# Patient Record
Sex: FEMALE | Race: BLACK OR AFRICAN AMERICAN | NOT HISPANIC OR LATINO | Employment: UNEMPLOYED | URBAN - METROPOLITAN AREA
[De-identification: names, ages, dates, MRNs, and addresses within clinical notes are randomized per-mention and may not be internally consistent; named-entity substitution may affect disease eponyms.]

---

## 2021-04-16 ENCOUNTER — PATIENT MESSAGE (OUTPATIENT)
Dept: RESEARCH | Facility: HOSPITAL | Age: 51
End: 2021-04-16

## 2021-11-16 ENCOUNTER — TELEPHONE (OUTPATIENT)
Dept: OBSTETRICS AND GYNECOLOGY | Facility: CLINIC | Age: 51
End: 2021-11-16
Payer: COMMERCIAL

## 2021-11-16 DIAGNOSIS — Z12.39 SCREENING BREAST EXAMINATION: Primary | ICD-10-CM

## 2021-11-17 ENCOUNTER — HOSPITAL ENCOUNTER (OUTPATIENT)
Dept: RADIOLOGY | Facility: OTHER | Age: 51
Discharge: HOME | End: 2021-11-17
Attending: OBSTETRICS & GYNECOLOGY
Payer: COMMERCIAL

## 2021-11-17 DIAGNOSIS — Z12.31 BREAST CANCER SCREENING BY MAMMOGRAM: ICD-10-CM

## 2021-11-17 DIAGNOSIS — Z12.39 SCREENING BREAST EXAMINATION: ICD-10-CM

## 2021-11-17 PROCEDURE — 77067 MAMMO DIGITAL SCREENING BILAT WITH TOMO: ICD-10-PCS | Mod: 26,,, | Performed by: RADIOLOGY

## 2021-11-17 PROCEDURE — 77067 SCR MAMMO BI INCL CAD: CPT | Mod: TC

## 2021-11-17 PROCEDURE — 77063 MAMMO DIGITAL SCREENING BILAT WITH TOMO: ICD-10-PCS | Mod: 26,,, | Performed by: RADIOLOGY

## 2021-11-17 PROCEDURE — 77067 SCR MAMMO BI INCL CAD: CPT | Mod: 26,,, | Performed by: RADIOLOGY

## 2021-11-17 PROCEDURE — 77063 BREAST TOMOSYNTHESIS BI: CPT | Mod: 26,,, | Performed by: RADIOLOGY

## 2021-11-22 ENCOUNTER — OFFICE VISIT (OUTPATIENT)
Dept: OBSTETRICS AND GYNECOLOGY | Facility: CLINIC | Age: 51
End: 2021-11-22
Payer: COMMERCIAL

## 2021-11-22 VITALS
BODY MASS INDEX: 41.07 KG/M2 | HEIGHT: 67 IN | SYSTOLIC BLOOD PRESSURE: 128 MMHG | WEIGHT: 261.69 LBS | DIASTOLIC BLOOD PRESSURE: 84 MMHG

## 2021-11-22 DIAGNOSIS — Z01.419 ROUTINE GYNECOLOGICAL EXAMINATION: Primary | ICD-10-CM

## 2021-11-22 PROCEDURE — 99999 PR PBB SHADOW E&M-EST. PATIENT-LVL III: CPT | Mod: PBBFAC,,, | Performed by: OBSTETRICS & GYNECOLOGY

## 2021-11-22 PROCEDURE — 99386 PREV VISIT NEW AGE 40-64: CPT | Mod: S$GLB,,, | Performed by: OBSTETRICS & GYNECOLOGY

## 2021-11-22 PROCEDURE — 99386 PR PREVENTIVE VISIT,NEW,40-64: ICD-10-PCS | Mod: S$GLB,,, | Performed by: OBSTETRICS & GYNECOLOGY

## 2021-11-22 PROCEDURE — 99999 PR PBB SHADOW E&M-EST. PATIENT-LVL III: ICD-10-PCS | Mod: PBBFAC,,, | Performed by: OBSTETRICS & GYNECOLOGY

## 2021-11-22 PROCEDURE — 88175 CYTOPATH C/V AUTO FLUID REDO: CPT | Performed by: OBSTETRICS & GYNECOLOGY

## 2021-11-29 LAB
FINAL PATHOLOGIC DIAGNOSIS: NORMAL
Lab: NORMAL

## 2024-02-22 ENCOUNTER — TELEPHONE (OUTPATIENT)
Dept: INTERNAL MEDICINE | Facility: CLINIC | Age: 54
End: 2024-02-22
Payer: COMMERCIAL

## 2024-02-22 NOTE — TELEPHONE ENCOUNTER
----- Message from Dimitris Mesa sent at 2/22/2024 10:44 AM CST -----  Contact: self [phone]  New pt requesting a call in regards to appt, stated the nurse recommended her.    Please call and advise

## 2024-02-28 ENCOUNTER — OFFICE VISIT (OUTPATIENT)
Dept: INTERNAL MEDICINE | Facility: CLINIC | Age: 54
End: 2024-02-28
Payer: COMMERCIAL

## 2024-02-28 VITALS
HEIGHT: 67 IN | SYSTOLIC BLOOD PRESSURE: 122 MMHG | HEART RATE: 87 BPM | WEIGHT: 264.31 LBS | DIASTOLIC BLOOD PRESSURE: 86 MMHG | BODY MASS INDEX: 41.48 KG/M2

## 2024-02-28 DIAGNOSIS — Z12.31 ENCOUNTER FOR SCREENING MAMMOGRAM FOR MALIGNANT NEOPLASM OF BREAST: ICD-10-CM

## 2024-02-28 DIAGNOSIS — Z00.00 ENCOUNTER FOR MEDICAL EXAMINATION TO ESTABLISH CARE: ICD-10-CM

## 2024-02-28 DIAGNOSIS — Z23 NEED FOR VACCINATION: ICD-10-CM

## 2024-02-28 DIAGNOSIS — E04.9 GOITER: Primary | ICD-10-CM

## 2024-02-28 DIAGNOSIS — Z01.89 ENCOUNTER FOR ROUTINE LABORATORY TESTING: ICD-10-CM

## 2024-02-28 DIAGNOSIS — Z12.11 COLON CANCER SCREENING: ICD-10-CM

## 2024-02-28 PROCEDURE — 99999 PR PBB SHADOW E&M-EST. PATIENT-LVL IV: CPT | Mod: PBBFAC,,, | Performed by: STUDENT IN AN ORGANIZED HEALTH CARE EDUCATION/TRAINING PROGRAM

## 2024-02-28 PROCEDURE — 3074F SYST BP LT 130 MM HG: CPT | Mod: CPTII,S$GLB,, | Performed by: STUDENT IN AN ORGANIZED HEALTH CARE EDUCATION/TRAINING PROGRAM

## 2024-02-28 PROCEDURE — 3008F BODY MASS INDEX DOCD: CPT | Mod: CPTII,S$GLB,, | Performed by: STUDENT IN AN ORGANIZED HEALTH CARE EDUCATION/TRAINING PROGRAM

## 2024-02-28 PROCEDURE — 1160F RVW MEDS BY RX/DR IN RCRD: CPT | Mod: CPTII,S$GLB,, | Performed by: STUDENT IN AN ORGANIZED HEALTH CARE EDUCATION/TRAINING PROGRAM

## 2024-02-28 PROCEDURE — 99203 OFFICE O/P NEW LOW 30 MIN: CPT | Mod: S$GLB,,, | Performed by: STUDENT IN AN ORGANIZED HEALTH CARE EDUCATION/TRAINING PROGRAM

## 2024-02-28 PROCEDURE — 3079F DIAST BP 80-89 MM HG: CPT | Mod: CPTII,S$GLB,, | Performed by: STUDENT IN AN ORGANIZED HEALTH CARE EDUCATION/TRAINING PROGRAM

## 2024-02-28 PROCEDURE — 1159F MED LIST DOCD IN RCRD: CPT | Mod: CPTII,S$GLB,, | Performed by: STUDENT IN AN ORGANIZED HEALTH CARE EDUCATION/TRAINING PROGRAM

## 2024-02-28 RX ORDER — MULTIVITAMIN
1 TABLET ORAL DAILY
COMMUNITY

## 2024-02-28 RX ORDER — AMOXICILLIN 500 MG
CAPSULE ORAL DAILY
COMMUNITY

## 2024-02-28 NOTE — PATIENT INSTRUCTIONS
VACCINES  The shingles and Tdap (tetanus) vaccines have been ordered which you will have completed today.     LABS  Labs have been ordered. Please have completed at your earliest convenience.    IMAGING  A thyroid ultrasound and mammogram have been ordered. Please schedule at check out or by calling [phone].    COLONOSCOPY  Referral for colonoscopy scheduling has been ordered. They will call you to schedule your exam.    FOLLOW-UP  Please return to see me within the next 4 weeks after completing lab work.

## 2024-02-28 NOTE — PROGRESS NOTES
[facility] ESTABLISH CARE VISIT    CHIEF COMPLAINT:   Chief Complaint   Patient presents with    [facility]       HISTORY OF PRESENT ILLNESS: Carlota Galarza is a 53 y.o. female who presents here today to [facility]. She has not seen a PCP for many years - last evaluation was with Dr. Sanches in 2013.     Patient has a history of goiter which has progressively been getting larger over the years. She has had the goiter since age 19 but has mostly been getting worse over the last 12 years. Thyroid function has historically been normal - last TSH in our records is from 2014 and was normal. Over the past year or so she has noticed some difficulty swallowing and speaking at times due to the size of the goiter. She denies symptoms of thyroid dysfunction including fatigue, cold intolerance, weight changes, hair loss, palpitations.     Patient otherwise has no medical conditions. She does take some vitamins and supplements but no other medications. She has no acute concerns today.      MEDICAL HISTORY:    Past Medical History:   Diagnosis Date    Goiter        MEDICATIONS:    Current Outpatient Medications on File Prior to Visit   Medication Sig Dispense Refill    ergocalciferol, vitamin D2, (VITAMIN D ORAL) Take by mouth.      multivitamin (ONE DAILY MULTIVITAMIN) per tablet Take 1 tablet by mouth once daily.      omega-3 fatty acids/fish oil (FISH OIL-OMEGA-3 FATTY ACIDS) 300-1,000 mg capsule Take by mouth once daily.      ZINC CITRATE ORAL Take by mouth.      [DISCONTINUED] misoprostol (CYTOTEC) 200 MCG Tab TAKE 9H, 5H, 1H BEFORE PROCEDURE (Patient not taking: Reported on 11/22/2021) 3 tablet 0    [DISCONTINUED] norethindrone (AYGESTIN) 5 mg Tab Take 1 tablet (5 mg total) by mouth once daily. TAKE D 1-10 Q MONTH 10 tablet 12     No current facility-administered medications on file prior to visit.       PHYSICAL EXAM:    /86 (BP Location: Left arm, Patient Position: Sitting, BP Method: X-Large  "(Manual))   Pulse 87   Ht 5' 7" (1.702 m)   Wt 119.9 kg (264 lb 5.3 oz)   BMI 41.40 kg/m²     Physical Exam  Vitals and nursing note reviewed.   Constitutional:       General: She is not in acute distress.     Appearance: Normal appearance. She is not ill-appearing, toxic-appearing or diaphoretic.   HENT:      Head: Normocephalic and atraumatic.      Nose: Nose normal.   Eyes:      Extraocular Movements: Extraocular movements intact.      Conjunctiva/sclera: Conjunctivae normal.      Pupils: Pupils are equal, round, and reactive to light.   Neck:      Thyroid: Thyromegaly present. No thyroid tenderness.      Comments: Large goiter, left > right. No discrete mass palpated. Non-tender.  Cardiovascular:      Rate and Rhythm: Normal rate and regular rhythm.      Heart sounds: Normal heart sounds. No murmur heard.  Pulmonary:      Effort: Pulmonary effort is normal. No respiratory distress.      Breath sounds: Normal breath sounds. No wheezing.   Musculoskeletal:         General: No deformity. Normal range of motion.      Cervical back: Full passive range of motion without pain.   Skin:     Findings: No lesion or rash.   Neurological:      General: No focal deficit present.      Mental Status: She is alert.      Motor: No weakness.      Gait: Gait normal.   Psychiatric:         Mood and Affect: Mood normal.         Behavior: Behavior normal.         Thought Content: Thought content normal.         Judgment: Judgment normal.              ASSESSMENT & PLAN:    Carlota was seen today for establish care.    Diagnoses and all orders for this visit:    Goiter  Patient presenting with chronic painless goiter progressively increasing in size over the past few years.   Last TSH 2014 WNL  Last US 2013: diffuse enlargement of both lobes of thyroid, no focal mass  Obtain updated US & Labs as below  -     US Thyroid; Future; Expected date: 02/28/2024  -     TSH; Future; Expected date: 02/28/2024  -     T4, FREE; Future; " Expected date: 02/28/2024    Colon cancer screening  No prior screening. FH of colon cancer in mother (diagnosed in 60s) and possibly father. Patient agreeable to colonoscopy. Ordered today.  -     Ambulatory referral/consult to Endo Procedure ; Future; Expected date: 02/29/2024    Encounter for screening mammogram for malignant neoplasm of breast  Last mammogram 2021 WNL - updated mammogram ordered today.  -     Mammo Digital Screening Bilat w/ John; Future; Expected date: 02/28/2024    Encounter for routine laboratory testing  Patient has not had labs drawn for several years and is due for diabetes screening, cholesterol screening, hepatitis C screening, HIV screening.   Labs ordered as below.  -     CBC Auto Differential; Future; Expected date: 02/28/2024  -     Comprehensive Metabolic Panel; Future; Expected date: 02/28/2024  -     Hemoglobin A1C; Future; Expected date: 02/28/2024  -     Hepatitis C Antibody; Future; Expected date: 02/28/2024  -     HIV 1/2 Ag/Ab (4th Gen); Future; Expected date: 02/28/2024  -     Lipid Panel; Future; Expected date: 02/28/2024  -     TSH; Future; Expected date: 02/28/2024  -     T4, FREE; Future; Expected date: 02/28/2024    Need for vaccination  Patient due for shingles & Tdap vaccine - ordered today  Consider influenza & COVID booster next season    Encounter for medical examination to establish care  Reviewed medical history, surgical history, social history, medication list, health screenings, and immunizations.       Patient to follow-up within 4 weeks to discuss US & lab results.        Kortney Soto MD  [facility]

## 2024-02-29 ENCOUNTER — HOSPITAL ENCOUNTER (OUTPATIENT)
Dept: RADIOLOGY | Facility: HOSPITAL | Age: 54
Discharge: HOME | End: 2024-02-29
Attending: STUDENT IN AN ORGANIZED HEALTH CARE EDUCATION/TRAINING PROGRAM
Payer: COMMERCIAL

## 2024-02-29 DIAGNOSIS — E04.9 GOITER: ICD-10-CM

## 2024-02-29 PROCEDURE — 76536 US EXAM OF HEAD AND NECK: CPT | Mod: 26,,, | Performed by: RADIOLOGY

## 2024-02-29 PROCEDURE — 76536 US EXAM OF HEAD AND NECK: CPT | Mod: TC

## 2024-03-07 ENCOUNTER — TELEPHONE (OUTPATIENT)
Dept: ENDOSCOPY | Facility: HOSPITAL | Age: 54
End: 2024-03-07

## 2024-03-07 ENCOUNTER — CLINICAL SUPPORT (OUTPATIENT)
Dept: ENDOSCOPY | Facility: HOSPITAL | Age: 54
End: 2024-03-07
Attending: RADIOLOGY
Payer: COMMERCIAL

## 2024-03-07 DIAGNOSIS — Z12.11 COLON CANCER SCREENING: ICD-10-CM

## 2024-03-07 NOTE — TELEPHONE ENCOUNTER
Spoke to pt to schedule procedure(s) Colonoscopy       Physician to perform procedure(s) Dr. VANDANA Dominguez  Date of Procedure (s) 6/4/24  Arrival Time 1:00 PM  Time of Procedure(s) 2:00 PM   Location of Procedure(s) [facility] [address] [facility]  Type of Rx Prep sent to patient: Miralax  Instructions provided to patient via [facility]    Patient was informed on the following information and verbalized understanding. Screening questionnaire reviewed with patient and complete. If procedure requires anesthesia, a responsible adult needs to be present to accompany the patient home, patient cannot drive after receiving anesthesia. Appointment details are tentative, especially check-in time. Patient will receive a prep-op call 7 days prior to confirm check-in time for procedure. If applicable the patient should contact their pharmacy to verify Rx for procedure prep is ready for pick-up. Patient was advised to call the scheduling department at [phone] if pharmacy states no Rx is available. Patient was advised to call the endoscopy scheduling department if any questions or concerns arise.      [facility] Scheduling Department

## 2024-03-25 ENCOUNTER — LAB VISIT (OUTPATIENT)
Dept: LAB | Facility: HOSPITAL | Age: 54
End: 2024-03-25
Payer: COMMERCIAL

## 2024-03-25 ENCOUNTER — OFFICE VISIT (OUTPATIENT)
Dept: INTERNAL MEDICINE | Facility: CLINIC | Age: 54
End: 2024-03-25
Payer: COMMERCIAL

## 2024-03-25 VITALS
BODY MASS INDEX: 41.21 KG/M2 | HEIGHT: 67 IN | HEART RATE: 66 BPM | DIASTOLIC BLOOD PRESSURE: 88 MMHG | OXYGEN SATURATION: 98 % | SYSTOLIC BLOOD PRESSURE: 128 MMHG | WEIGHT: 262.56 LBS

## 2024-03-25 DIAGNOSIS — E78.00 BORDERLINE HYPERCHOLESTEROLEMIA: ICD-10-CM

## 2024-03-25 DIAGNOSIS — E04.9 GOITER: Primary | ICD-10-CM

## 2024-03-25 DIAGNOSIS — E04.9 GOITER: ICD-10-CM

## 2024-03-25 LAB
T3FREE SERPL-MCNC: 3.2 PG/ML (ref 2.3–4.2)
T4 FREE SERPL-MCNC: 0.63 NG/DL (ref 0.71–1.51)
THYROGLOB AB SERPL IA-ACNC: <4 IU/ML (ref 0–3.9)
THYROPEROXIDASE IGG SERPL-ACNC: 53.6 IU/ML
TSH SERPL DL<=0.005 MIU/L-ACNC: 1.53 UIU/ML (ref 0.4–4)

## 2024-03-25 PROCEDURE — 84443 ASSAY THYROID STIM HORMONE: CPT | Performed by: STUDENT IN AN ORGANIZED HEALTH CARE EDUCATION/TRAINING PROGRAM

## 2024-03-25 PROCEDURE — 86800 THYROGLOBULIN ANTIBODY: CPT | Performed by: STUDENT IN AN ORGANIZED HEALTH CARE EDUCATION/TRAINING PROGRAM

## 2024-03-25 PROCEDURE — 3008F BODY MASS INDEX DOCD: CPT | Mod: CPTII,S$GLB,, | Performed by: STUDENT IN AN ORGANIZED HEALTH CARE EDUCATION/TRAINING PROGRAM

## 2024-03-25 PROCEDURE — 1159F MED LIST DOCD IN RCRD: CPT | Mod: CPTII,S$GLB,, | Performed by: STUDENT IN AN ORGANIZED HEALTH CARE EDUCATION/TRAINING PROGRAM

## 2024-03-25 PROCEDURE — 84439 ASSAY OF FREE THYROXINE: CPT | Performed by: STUDENT IN AN ORGANIZED HEALTH CARE EDUCATION/TRAINING PROGRAM

## 2024-03-25 PROCEDURE — 86800 THYROGLOBULIN ANTIBODY: CPT | Mod: 91 | Performed by: STUDENT IN AN ORGANIZED HEALTH CARE EDUCATION/TRAINING PROGRAM

## 2024-03-25 PROCEDURE — 3074F SYST BP LT 130 MM HG: CPT | Mod: CPTII,S$GLB,, | Performed by: STUDENT IN AN ORGANIZED HEALTH CARE EDUCATION/TRAINING PROGRAM

## 2024-03-25 PROCEDURE — 3079F DIAST BP 80-89 MM HG: CPT | Mod: CPTII,S$GLB,, | Performed by: STUDENT IN AN ORGANIZED HEALTH CARE EDUCATION/TRAINING PROGRAM

## 2024-03-25 PROCEDURE — 99213 OFFICE O/P EST LOW 20 MIN: CPT | Mod: S$GLB,,, | Performed by: STUDENT IN AN ORGANIZED HEALTH CARE EDUCATION/TRAINING PROGRAM

## 2024-03-25 PROCEDURE — 99999 PR PBB SHADOW E&M-EST. PATIENT-LVL IV: CPT | Mod: PBBFAC,,, | Performed by: STUDENT IN AN ORGANIZED HEALTH CARE EDUCATION/TRAINING PROGRAM

## 2024-03-25 PROCEDURE — 86376 MICROSOMAL ANTIBODY EACH: CPT | Performed by: STUDENT IN AN ORGANIZED HEALTH CARE EDUCATION/TRAINING PROGRAM

## 2024-03-25 PROCEDURE — 3044F HG A1C LEVEL LT 7.0%: CPT | Mod: CPTII,S$GLB,, | Performed by: STUDENT IN AN ORGANIZED HEALTH CARE EDUCATION/TRAINING PROGRAM

## 2024-03-25 PROCEDURE — 1160F RVW MEDS BY RX/DR IN RCRD: CPT | Mod: CPTII,S$GLB,, | Performed by: STUDENT IN AN ORGANIZED HEALTH CARE EDUCATION/TRAINING PROGRAM

## 2024-03-25 PROCEDURE — 84481 FREE ASSAY (FT-3): CPT | Performed by: STUDENT IN AN ORGANIZED HEALTH CARE EDUCATION/TRAINING PROGRAM

## 2024-03-25 NOTE — PROGRESS NOTES
[facility] PRIMARY CARE FOLLOW-UP VISIT    CHIEF COMPLAINT:   Chief Complaint   Patient presents with    Follow-up       HISTORY OF PRESENT ILLNESS: Carlota Galarza is a 53 y.o. female who presents here today for follow-up of the following:    Goiter  US thyroid done 2/28 revealing of diffuse markedly enlarged heterogeneous thyroid gland with increased vascularity consistent with autoimmune thyroiditis.   Labs 2/29 with TSH normal & T4 slightly low at 0.62.     Lab F/U  Labs ordered last visit 2/28; collected 2/29.  Lipid panel with cholesterol 217, triglycerides 171. ASCVD risk score 3.7%.  TSH & T4 as above.  CBC, CMP, A1c, hep c Ab, HIV also all within normal limits.    Cancer screening ordered last visit - Mammogram is scheduled 4/2 and Colonoscopy is scheduled 6/4.       MEDICAL HISTORY:    Past Medical History:   Diagnosis Date    Goiter        MEDICATIONS:    Current Outpatient Medications on File Prior to Visit   Medication Sig Dispense Refill    ergocalciferol, vitamin D2, (VITAMIN D ORAL) Take by mouth.      multivitamin (ONE DAILY MULTIVITAMIN) per tablet Take 1 tablet by mouth once daily.      omega-3 fatty acids/fish oil (FISH OIL-OMEGA-3 FATTY ACIDS) 300-1,000 mg capsule Take by mouth once daily.      ZINC CITRATE ORAL Take by mouth.       No current facility-administered medications on file prior to visit.       PHYSICAL EXAM:    /88 (BP Location: Left arm, Patient Position: Sitting)   Pulse 66   Ht 5' 7" (1.702 m)   Wt 119.1 kg (262 lb 9.1 oz)   SpO2 98%   BMI 41.12 kg/m²     Physical Exam  Vitals and nursing note reviewed.   Constitutional:       General: She is not in acute distress.     Appearance: Normal appearance. She is not ill-appearing, toxic-appearing or diaphoretic.   HENT:      Head: Normocephalic and atraumatic.      Nose: Nose normal.   Eyes:      Extraocular Movements: Extraocular movements intact.      Conjunctiva/sclera: Conjunctivae normal.      Pupils: Pupils are " equal, round, and reactive to light.   Neck:      Thyroid: Thyromegaly present. No thyroid tenderness.   Cardiovascular:      Rate and Rhythm: Normal rate and regular rhythm.      Heart sounds: Normal heart sounds. No murmur heard.  Pulmonary:      Effort: Pulmonary effort is normal. No respiratory distress.      Breath sounds: Normal breath sounds. No wheezing.   Musculoskeletal:         General: No deformity. Normal range of motion.   Skin:     Findings: No lesion or rash.   Neurological:      General: No focal deficit present.      Mental Status: She is alert.      Motor: No weakness.      Gait: Gait normal.   Psychiatric:         Mood and Affect: Mood normal.         Behavior: Behavior normal.         Thought Content: Thought content normal.         Judgment: Judgment normal.            LABS:    Lab Results   Component Value Date    TSH 1.452 02/29/2024    FREET4 0.62 (L) 02/29/2024     Cholesterol   Date Value Ref Range Status   02/29/2024 217 (H) 120 - 199 mg/dL Final     Comment:     The National Cholesterol Education Program (NCEP) has set the  following guidelines (reference ranges) for Cholesterol:  Optimal.....................<200 mg/dL  Borderline High.............200-239 mg/dL  High........................> or = 240 mg/dL       HDL   Date Value Ref Range Status   02/29/2024 44 40 - 75 mg/dL Final     Comment:     The National Cholesterol Education Program (NCEP) has set the  following guidelines (reference values) for HDL Cholesterol:  Low...............<40 mg/dL  Optimal...........>60 mg/dL       LDL Cholesterol   Date Value Ref Range Status   02/29/2024 138.8 63.0 - 159.0 mg/dL Final     Comment:     The National Cholesterol Education Program (NCEP) has set the  following guidelines (reference values) for LDL Cholesterol:  Optimal.......................<130 mg/dL  Borderline High...............130-159 mg/dL  High..........................160-189 mg/dL  Very High.....................>190 mg/dL        Triglycerides   Date Value Ref Range Status   02/29/2024 171 (H) 30 - 150 mg/dL Final     Comment:     The National Cholesterol Education Program (NCEP) has set the  following guidelines (reference values) for triglycerides:  Normal......................<150 mg/dL  Borderline High.............150-199 mg/dL  High........................200-499 mg/dL       The 10-year ASCVD risk score (Tiana AWAN, et al., 2019) is: 3.7%    Values used to calculate the score:      Age: 53 years      Sex: Female      Is Non- : Yes      Diabetic: No      Tobacco smoker: No      Systolic Blood Pressure: 128 mmHg      Is BP treated: No      HDL Cholesterol: 44 mg/dL      Total Cholesterol: 217 mg/dL    Lab Results   Component Value Date    WBC 6.25 02/29/2024    HGB 13.1 02/29/2024    HCT 41.4 02/29/2024    MCV 95 02/29/2024     02/29/2024     Lab Results   Component Value Date     02/29/2024    K 4.6 02/29/2024     02/29/2024    CO2 26 02/29/2024    BUN 12 02/29/2024    CREATININE 0.8 02/29/2024    CALCIUM 10.0 02/29/2024    ANIONGAP 10 02/29/2024    EGFRNORACEVR >60.0 02/29/2024     Lab Results   Component Value Date    ALT 12 02/29/2024    AST 16 02/29/2024    ALKPHOS 50 (L) 02/29/2024    BILITOT 1.3 (H) 02/29/2024     Lab Results   Component Value Date    HGBA1C 5.6 02/29/2024     Lab Results   Component Value Date    HEPCAB Non-reactive 02/29/2024     Lab Results   Component Value Date    HIV1X2 Negative 10/06/2011    MVM30EQCP Non-reactive 02/29/2024         IMAGING:    EXAMINATION:  US THYROID     CLINICAL HISTORY:  .  Nontoxic goiter, unspecified     TECHNIQUE:  Ultrasound of the thyroid and cervical lymph nodes was performed.     COMPARISON:  Ultrasound thyroid 06/11/2013     FINDINGS:  The thyroid is markedly enlarged.  Right lobe of the thyroid measures 14.5 x 4.9 x 6.0 cm.  Left lobe of the thyroid measures 13.4 x 7.0 x 10.9 cm.  This has increased in size when compared to the  previous examination of June 11, 2013.  The isthmus measures 5.0 cm.  Heterogeneous thyroid parenchyma with increased vascularity.     No nodules.     Cervical lymph nodes demonstrate normal morphology and size.     Impression:     Diffuse markedly enlarged, heterogeneous thyroid gland with increased vascularity consistent with autoimmune thyroiditis which has increased in size since previous examination of 2013.     Electronically signed by resident: Danielle Gillette  Date:                                            02/29/2024  Time:                                           15:38     Electronically signed by: Cal Pena MD  Date:                                            02/29/2024  Time:                                           15:46        ASSESSMENT & PLAN:    Carlota was seen today for follow-up.    Diagnoses and all orders for this visit:    Goiter  Patient with long history of goiter lost to follow-up 2013 now presenting with continued increasing goiter and occasional compressive symptoms including difficulty swallowing pills, difficulty talking with head leaning back.  US thyroid 2/29/24: Right lobe of the thyroid measures 14.5 x 4.9 x 6.0 cm.  Left lobe of the thyroid measures 13.4 x 7.0 x 10.9 cm.   US thyroid 6/11/13: right lobe 7.4 x 4 x 4.7 cm; left lobe 9.2 x 5.5 x 9.5 cm.   TSH normal at 1.452 and T4 slightly low 0.62. Thyroid labs historically normal.  Check labs as below  Referral to Head & Neck surgery        -     Ambulatory referral/consult to Endocrine/ENT Surgery for ENT/Head and Neck Surgeons   -     THYROID PEROXIDASE ANTIBODY; Future; Expected date: 03/25/2024  -     THYROGLOBULIN; Future; Expected date: 03/25/2024  -     THYROGLOBULIN AB SCREEN; Future; Expected date: 03/25/2024  -     TSH; Future; Expected date: 03/25/2024  -     T4, FREE; Future; Expected date: 03/25/2024  -     T3, FREE; Future; Expected date: 03/25/2024    Borderline hypercholesterolemia  Total cholesterol is  borderline elevated at 217, triglycerides borderline elevated 171. HDL & LDL appropriate.   ASCVD risk is 3.6%. No statin indicated. Discussed healthy lifestyle. Recheck lipid panel next year.            Kortney Soto MD  [facility]

## 2024-03-26 LAB
THRYOGLOBULIN INTERPRETATION: ABNORMAL
THYROGLOB AB SERPL-ACNC: <1.8 IU/ML
THYROGLOB SERPL-MCNC: 3882 NG/ML

## 2024-03-27 ENCOUNTER — PATIENT MESSAGE (OUTPATIENT)
Dept: INTERNAL MEDICINE | Facility: CLINIC | Age: 54
End: 2024-03-27
Payer: COMMERCIAL

## 2024-03-27 DIAGNOSIS — E04.9 GOITER: Primary | ICD-10-CM

## 2024-04-02 ENCOUNTER — HOSPITAL ENCOUNTER (OUTPATIENT)
Dept: RADIOLOGY | Facility: HOSPITAL | Age: 54
Discharge: HOME | End: 2024-04-02
Attending: STUDENT IN AN ORGANIZED HEALTH CARE EDUCATION/TRAINING PROGRAM
Payer: COMMERCIAL

## 2024-04-02 DIAGNOSIS — Z12.31 ENCOUNTER FOR SCREENING MAMMOGRAM FOR MALIGNANT NEOPLASM OF BREAST: ICD-10-CM

## 2024-04-02 PROCEDURE — 77063 BREAST TOMOSYNTHESIS BI: CPT | Mod: TC

## 2024-04-02 PROCEDURE — 77067 SCR MAMMO BI INCL CAD: CPT | Mod: 26,,, | Performed by: RADIOLOGY

## 2024-04-02 PROCEDURE — 77063 BREAST TOMOSYNTHESIS BI: CPT | Mod: 26,,, | Performed by: RADIOLOGY

## 2024-04-04 ENCOUNTER — PATIENT MESSAGE (OUTPATIENT)
Dept: INTERNAL MEDICINE | Facility: CLINIC | Age: 54
End: 2024-04-04
Payer: COMMERCIAL

## 2024-04-04 DIAGNOSIS — Z91.89 AT INCREASED RISK OF BREAST CANCER: Primary | ICD-10-CM

## 2024-04-04 NOTE — TELEPHONE ENCOUNTER
Recent mammogram BIRADS 1= negative.   Tyrer-Cuzick risk 26.39%.   Referral to [facility] placed.   Patient notified.       Kortney Soto MD  [facility]

## 2024-04-15 ENCOUNTER — OFFICE VISIT (OUTPATIENT)
Dept: OTOLARYNGOLOGY | Facility: CLINIC | Age: 54
End: 2024-04-15
Payer: COMMERCIAL

## 2024-04-15 VITALS
DIASTOLIC BLOOD PRESSURE: 89 MMHG | BODY MASS INDEX: 41.68 KG/M2 | WEIGHT: 266.13 LBS | SYSTOLIC BLOOD PRESSURE: 142 MMHG

## 2024-04-15 DIAGNOSIS — E04.9 GOITER: ICD-10-CM

## 2024-04-15 DIAGNOSIS — E04.9 THYROID GOITER: Primary | ICD-10-CM

## 2024-04-15 DIAGNOSIS — E06.3 HASHIMOTO'S THYROIDITIS: ICD-10-CM

## 2024-04-15 PROCEDURE — 3077F SYST BP >= 140 MM HG: CPT | Mod: CPTII,S$GLB,, | Performed by: OTOLARYNGOLOGY

## 2024-04-15 PROCEDURE — 99999 PR PBB SHADOW E&M-EST. PATIENT-LVL III: CPT | Mod: PBBFAC,,, | Performed by: OTOLARYNGOLOGY

## 2024-04-15 PROCEDURE — 3044F HG A1C LEVEL LT 7.0%: CPT | Mod: CPTII,S$GLB,, | Performed by: OTOLARYNGOLOGY

## 2024-04-15 PROCEDURE — 1159F MED LIST DOCD IN RCRD: CPT | Mod: CPTII,S$GLB,, | Performed by: OTOLARYNGOLOGY

## 2024-04-15 PROCEDURE — 99204 OFFICE O/P NEW MOD 45 MIN: CPT | Mod: S$GLB,,, | Performed by: OTOLARYNGOLOGY

## 2024-04-15 PROCEDURE — 3079F DIAST BP 80-89 MM HG: CPT | Mod: CPTII,S$GLB,, | Performed by: OTOLARYNGOLOGY

## 2024-04-15 PROCEDURE — 3008F BODY MASS INDEX DOCD: CPT | Mod: CPTII,S$GLB,, | Performed by: OTOLARYNGOLOGY

## 2024-04-17 NOTE — PROGRESS NOTES
No chief complaint on file.        53 y.o. female presents with longstanding history of thyroid goiter. No associated voice changes, dysphagia, or orthopnea. She does notice some increased snoring. No family history of thyroid carcinoma, no personal history of neck radiation. TSH 1.53, FT4 0.63 FT3 3.2, TPO antib elevated.      Review of Systems     Constitutional: Negative for fatigue and unexpected weight change.   HENT: per HPI  Eyes: Negative for visual disturbance.   Respiratory: Negative for shortness of breath, hemoptysis  Cardiovascular: Negative for chest pain and palpitations.   Genitourinary: Negative for dysuria and difficulty urinating.   Musculoskeletal: Negative for decreased ROM, back pain.   Skin: Negative for rash, sunburn, itching.   Neurological: Negative for dizziness and seizures.   Hematological: Negative for adenopathy. Does not bruise/bleed easily.   Psychiatric/Behavioral: Negative for agitation. The patient is not nervous/anxious.   Endocrine: Negative for rapid weight loss/weight gain, heat/cold intolerance.     Past Medical History:   Diagnosis Date    Goiter        Past Surgical History:   Procedure Laterality Date    BTL      w/      SECTION       SECTION       SECTION      LIPOMA RESECTION Right 2013    upper arm       family history includes Anxiety disorder in her daughter; Breast cancer in her paternal cousin and paternal cousin; Breast cancer (age of onset: 26) in her sister; Cancer in her father; Colon cancer (age of onset: 64) in her mother; Depression in her daughter; Goiter in her sister; Hypertension in her brother, brother, brother, sister, sister, and sister; No Known Problems in her daughter, daughter, maternal grandfather, maternal grandmother, paternal grandmother, and sister; Pulmonary embolism in her brother; Schizophrenia in her paternal grandfather.    Pt  reports that she has never smoked. She has never used smokeless  tobacco. She reports that she does not drink alcohol and does not use drugs.    Review of patient's allergies indicates:  No Known Allergies     Physical Exam    Vitals:    04/15/24 1322   BP: (!) 142/89     Body mass index is 41.68 kg/m².    Physical Exam  Vitals and nursing note reviewed.   Constitutional:       General: She is not in acute distress.     Appearance: She is well-developed. She is not diaphoretic.   HENT:      Head: Normocephalic and atraumatic.      Right Ear: Hearing and external ear normal. No decreased hearing noted.      Left Ear: Hearing and external ear normal. No decreased hearing noted.      Nose: Nose normal.      Mouth/Throat:      Mouth: Mucous membranes are moist. No oral lesions.      Tongue: No lesions.      Palate: No mass and lesions.      Pharynx: Oropharynx is clear. Uvula midline.   Eyes:      General: Lids are normal.         Right eye: No discharge.         Left eye: No discharge.      Conjunctiva/sclera: Conjunctivae normal.      Pupils: Pupils are equal, round, and reactive to light.   Neck:      Thyroid: Thyromegaly present.      Trachea: Trachea and phonation normal. No tracheal tenderness or tracheal deviation.     Cardiovascular:      Heart sounds: Normal heart sounds.   Pulmonary:      Breath sounds: Normal breath sounds. No stridor.   Abdominal:      Palpations: Abdomen is soft.   Musculoskeletal:      Cervical back: Normal range of motion and neck supple. No edema or erythema.   Lymphadenopathy:      Cervical: No cervical adenopathy.   Skin:     General: Skin is warm and dry.      Coloration: Skin is not pale.      Findings: No erythema or rash.   Neurological:      Mental Status: She is alert and oriented to person, place, and time.        Procedure: Flexible laryngoscopy  In order to fully examine the upper aerodigestive tract, including the larynx, in a patient with a hyperactive gag reflex, flexible endoscopy is required.  After explaining the procedure and  obtaining verbal consent, a timeout was performed with the patient's participation according to the universal protocol. Both nasal cavities were anesthetized with 4% Xylocaine spray mixed with Maximiliano-Synephrine. The flexible laryngoscope was inserted into the nasal cavity and advanced to visualize the nasal cavity, nasopharynx, the posterior oropharynx, hypopharynx, and the endolarynx with the above findings noted. The scope was removed and the procedure terminated. The patient tolerated this procedure well without apparent complication.      Nasopharynx - the torus is clear. There are no lesions of the posterior wall.   Oropharynx - no lesions of the tongue base. There is no obvious fullness or asymmetry.  Hypopharynx - there are no lesions of the pyriform sinuses or postcricoid region   Larynx - there are no lesions of the supraglottic or glottic larynx. Vocal fold mobility is normal with complete closure.        US Thyroid 2/29/24:  FINDINGS:  The thyroid is markedly enlarged.  Right lobe of the thyroid measures 14.5 x 4.9 x 6.0 cm.  Left lobe of the thyroid measures 13.4 x 7.0 x 10.9 cm.  This has increased in size when compared to the previous examination of June 11, 2013.  The isthmus measures 5.0 cm.  Heterogeneous thyroid parenchyma with increased vascularity.     No nodules.     Cervical lymph nodes demonstrate normal morphology and size.    Assessment     1. Thyroid goiter    2. Goiter    3. Hashimoto's thyroiditis          Plan  In summary, Ms. Galarza is a 53 year old female with significant goiter, asymptomatic. Labs consistent with Hashimoto's. Some recent growth on US. CT ordered for further evaluation of chest extension. She will follow up after CT to discuss options. All questions were answered and she is in agreement with the plan.

## 2024-04-22 ENCOUNTER — HOSPITAL ENCOUNTER (OUTPATIENT)
Dept: RADIOLOGY | Facility: HOSPITAL | Age: 54
Discharge: HOME | End: 2024-04-22
Attending: OTOLARYNGOLOGY
Payer: COMMERCIAL

## 2024-04-22 DIAGNOSIS — E04.9 THYROID GOITER: ICD-10-CM

## 2024-04-22 PROCEDURE — 25500020 PHARM REV CODE 255: Performed by: OTOLARYNGOLOGY

## 2024-04-22 PROCEDURE — 70491 CT SOFT TISSUE NECK W/DYE: CPT | Mod: 26,,, | Performed by: RADIOLOGY

## 2024-04-22 PROCEDURE — 70491 CT SOFT TISSUE NECK W/DYE: CPT | Mod: TC

## 2024-04-22 RX ADMIN — IOHEXOL 100 ML: 350 INJECTION, SOLUTION INTRAVENOUS at 08:04

## 2024-04-24 ENCOUNTER — OFFICE VISIT (OUTPATIENT)
Dept: OTOLARYNGOLOGY | Facility: CLINIC | Age: 54
End: 2024-04-24
Payer: COMMERCIAL

## 2024-04-24 VITALS — SYSTOLIC BLOOD PRESSURE: 150 MMHG | HEART RATE: 102 BPM | DIASTOLIC BLOOD PRESSURE: 83 MMHG

## 2024-04-24 DIAGNOSIS — E04.9 THYROID GOITER: Primary | ICD-10-CM

## 2024-04-24 PROCEDURE — 1159F MED LIST DOCD IN RCRD: CPT | Mod: CPTII,S$GLB,, | Performed by: OTOLARYNGOLOGY

## 2024-04-24 PROCEDURE — 99999 PR PBB SHADOW E&M-EST. PATIENT-LVL III: CPT | Mod: PBBFAC,,, | Performed by: OTOLARYNGOLOGY

## 2024-04-24 PROCEDURE — 99215 OFFICE O/P EST HI 40 MIN: CPT | Mod: S$GLB,,, | Performed by: OTOLARYNGOLOGY

## 2024-04-24 PROCEDURE — 3079F DIAST BP 80-89 MM HG: CPT | Mod: CPTII,S$GLB,, | Performed by: OTOLARYNGOLOGY

## 2024-04-24 PROCEDURE — 3044F HG A1C LEVEL LT 7.0%: CPT | Mod: CPTII,S$GLB,, | Performed by: OTOLARYNGOLOGY

## 2024-04-24 PROCEDURE — 3077F SYST BP >= 140 MM HG: CPT | Mod: CPTII,S$GLB,, | Performed by: OTOLARYNGOLOGY

## 2024-04-24 NOTE — PROGRESS NOTES
Chief Complaint   Patient presents with    post ct         53 y.o. female presents with longstanding history of thyroid goiter. No associated voice changes, dysphagia, or orthopnea. She does notice some increased snoring. No family history of thyroid carcinoma, no personal history of neck radiation. TSH 1.53, FT4 0.63 FT3 3.2, TPO antib elevated.    Here to discuss CT results:  FINDINGS:  There is a large presumed goiter involving the left greater than right lobes of the thyroid gland a. It does not extend below the level of the clavicular heads.     The right lobe measures 102 mm in AP dimension by 66 mm in transverse dimension by 114 mm in vertical dimension.  The left lobe measures 115 mm AP dimension by 85 mm transverse dimension by 109 mm in vertical dimension.  The esophagus is displaced to the right.     There is displacement of the trachea to the right with narrowing of the trachea  moderate in degree measuring 12 mm AP dimension by 11 mm in transverse dimension and more inferiorly 8 mm in AP dimension by 13 mm in transverse dimension..  The right internal jugular vein and the common carotid artery displaced laterally.  The left common carotid artery is displaced posteriorly.  The left internal jugular vein is displaced laterally.         Review of Systems     Constitutional: Negative for fatigue and unexpected weight change.   HENT: per HPI  Eyes: Negative for visual disturbance.   Respiratory: Negative for shortness of breath, hemoptysis  Cardiovascular: Negative for chest pain and palpitations.   Genitourinary: Negative for dysuria and difficulty urinating.   Musculoskeletal: Negative for decreased ROM, back pain.   Skin: Negative for rash, sunburn, itching.   Neurological: Negative for dizziness and seizures.   Hematological: Negative for adenopathy. Does not bruise/bleed easily.   Psychiatric/Behavioral: Negative for agitation. The patient is not nervous/anxious.   Endocrine: Negative for rapid weight  loss/weight gain, heat/cold intolerance.     Past Medical History:   Diagnosis Date    Goiter        Past Surgical History:   Procedure Laterality Date    BTL      w/      SECTION       SECTION       SECTION      LIPOMA RESECTION Right 2013    upper arm       family history includes Anxiety disorder in her daughter; Breast cancer in her paternal cousin and paternal cousin; Breast cancer (age of onset: 26) in her sister; Cancer in her father; Colon cancer (age of onset: 64) in her mother; Depression in her daughter; Goiter in her sister; Hypertension in her brother, brother, brother, sister, sister, and sister; No Known Problems in her daughter, daughter, maternal grandfather, maternal grandmother, paternal grandmother, and sister; Pulmonary embolism in her brother; Schizophrenia in her paternal grandfather.    Pt  reports that she has never smoked. She has never used smokeless tobacco. She reports that she does not drink alcohol and does not use drugs.    Review of patient's allergies indicates:  No Known Allergies     Physical Exam    Vitals:    24 1015   BP: (!) 150/83   Pulse: 102     There is no height or weight on file to calculate BMI.    Physical Exam  Vitals and nursing note reviewed.   Constitutional:       General: She is not in acute distress.     Appearance: She is well-developed. She is not diaphoretic.   HENT:      Head: Normocephalic and atraumatic.      Right Ear: Hearing and external ear normal. No decreased hearing noted.      Left Ear: Hearing and external ear normal. No decreased hearing noted.      Nose: Nose normal.      Mouth/Throat:      Mouth: Mucous membranes are moist. No oral lesions.      Tongue: No lesions.      Palate: No mass and lesions.      Pharynx: Oropharynx is clear. Uvula midline.   Eyes:      General: Lids are normal.         Right eye: No discharge.         Left eye: No discharge.      Conjunctiva/sclera: Conjunctivae  normal.      Pupils: Pupils are equal, round, and reactive to light.   Neck:      Thyroid: Thyromegaly present.      Trachea: Trachea and phonation normal. No tracheal tenderness or tracheal deviation.     Cardiovascular:      Heart sounds: Normal heart sounds.   Pulmonary:      Breath sounds: Normal breath sounds. No stridor.   Abdominal:      Palpations: Abdomen is soft.   Musculoskeletal:      Cervical back: Normal range of motion and neck supple. No edema or erythema.   Lymphadenopathy:      Cervical: No cervical adenopathy.   Skin:     General: Skin is warm and dry.      Coloration: Skin is not pale.      Findings: No erythema or rash.   Neurological:      Mental Status: She is alert and oriented to person, place, and time.      Procedure: Flexible laryngoscopy  In order to fully examine the upper aerodigestive tract, including the larynx, in a patient with a hyperactive gag reflex, flexible endoscopy is required.  After explaining the procedure and obtaining verbal consent, a timeout was performed with the patient's participation according to the universal protocol. Both nasal cavities were anesthetized with 4% Xylocaine spray mixed with Maximiliano-Synephrine. The flexible laryngoscope was inserted into the nasal cavity and advanced to visualize the nasal cavity, nasopharynx, the posterior oropharynx, hypopharynx, and the endolarynx with the above findings noted. The scope was removed and the procedure terminated. The patient tolerated this procedure well without apparent complication.      Nasopharynx - the torus is clear. There are no lesions of the posterior wall.   Oropharynx - no lesions of the tongue base. There is no obvious fullness or asymmetry.  Hypopharynx - there are no lesions of the pyriform sinuses or postcricoid region   Larynx - there are no lesions of the supraglottic or glottic larynx. Vocal fold mobility is normal with complete closure.        US Thyroid 2/29/24:  FINDINGS:  The thyroid is  markedly enlarged.  Right lobe of the thyroid measures 14.5 x 4.9 x 6.0 cm.  Left lobe of the thyroid measures 13.4 x 7.0 x 10.9 cm.  This has increased in size when compared to the previous examination of June 11, 2013.  The isthmus measures 5.0 cm.  Heterogeneous thyroid parenchyma with increased vascularity.     No nodules.     Cervical lymph nodes demonstrate normal morphology and size.    Assessment     1. Thyroid goiter            Plan  In summary, Ms. Galarza is a 53 year old female with significant goiter, asymptomatic. Labs consistent with Hashimoto's. Some recent growth on US and CT. No substernal extension. I have offered her a thyroidectomy given the size of her goiter and her likeliness to have symptoms in the future. I explained the risks of thyroidectomy include, but are not limited to, infection, bleeding, scarring, failure to achieve the diagnosis, no evidence of cancer, recurrence, collection of blood or tissue fluid requiring drainage, injury to the recurrent laryngeal nerve with resultant temporary or permanent hoarseness (1% permanent risk with up to 10% temporary risk, greater in revision operations), injury to the superior laryngeal nerve with resultant loss of the upper register for singing or challenges with yelling, temporary or permanent hypocalcemia related to injury or devascularization of the parathyroid glands (less than 5% permanent, up to 30-60% when paratracheal dissection is accomplished, again greater in revision operations), and the need for additional procedures or therapies.  Time was allowed for questions, and all questions were answered to the patient's apparent satisfaction. The risks of paratracheal lymph node dissection are included above. Informed consent was obtained.     She is considering timing of surgery and will contact us when she is ready to schedule.

## 2024-05-27 ENCOUNTER — TELEPHONE (OUTPATIENT)
Dept: ENDOSCOPY | Facility: HOSPITAL | Age: 54
End: 2024-05-27
Payer: COMMERCIAL

## 2024-06-26 ENCOUNTER — OFFICE VISIT (OUTPATIENT)
Dept: ENDOCRINOLOGY | Facility: CLINIC | Age: 54
End: 2024-06-26
Payer: COMMERCIAL

## 2024-06-26 VITALS
HEART RATE: 65 BPM | WEIGHT: 268.31 LBS | DIASTOLIC BLOOD PRESSURE: 74 MMHG | BODY MASS INDEX: 43.3 KG/M2 | SYSTOLIC BLOOD PRESSURE: 126 MMHG

## 2024-06-26 DIAGNOSIS — E66.01 CLASS 3 OBESITY: ICD-10-CM

## 2024-06-26 DIAGNOSIS — E04.9 GOITER: Primary | ICD-10-CM

## 2024-06-26 PROCEDURE — 3078F DIAST BP <80 MM HG: CPT | Mod: CPTII,S$GLB,, | Performed by: INTERNAL MEDICINE

## 2024-06-26 PROCEDURE — 3074F SYST BP LT 130 MM HG: CPT | Mod: CPTII,S$GLB,, | Performed by: INTERNAL MEDICINE

## 2024-06-26 PROCEDURE — 1159F MED LIST DOCD IN RCRD: CPT | Mod: CPTII,S$GLB,, | Performed by: INTERNAL MEDICINE

## 2024-06-26 PROCEDURE — 3044F HG A1C LEVEL LT 7.0%: CPT | Mod: CPTII,S$GLB,, | Performed by: INTERNAL MEDICINE

## 2024-06-26 PROCEDURE — 99204 OFFICE O/P NEW MOD 45 MIN: CPT | Mod: S$GLB,,, | Performed by: INTERNAL MEDICINE

## 2024-06-26 PROCEDURE — 3008F BODY MASS INDEX DOCD: CPT | Mod: CPTII,S$GLB,, | Performed by: INTERNAL MEDICINE

## 2024-06-26 PROCEDURE — 1160F RVW MEDS BY RX/DR IN RCRD: CPT | Mod: CPTII,S$GLB,, | Performed by: INTERNAL MEDICINE

## 2024-06-26 PROCEDURE — 99999 PR PBB SHADOW E&M-EST. PATIENT-LVL III: CPT | Mod: PBBFAC,,, | Performed by: INTERNAL MEDICINE

## 2024-06-26 NOTE — PROGRESS NOTES
Subjective:      Patient ID: Carlota Galarza is referred by Kortney Soto MD     Chief Complaint:  Goiter    History of Present Illness    Carlota Galarza is a 53 y.o. female who presents for evaluation of goiter.       Reportedly diagnosed with goiter in mid 20s.     US thyroid 2013: Diffuse markedly enlarged, heterogeneous thyroid gland with increased vascularity consistent with autoimmune thyroiditis which has increased in size since previous examination of 2013.    US Thyroid 2/2024:      FINDINGS:  The thyroid is markedly enlarged.  Right lobe of the thyroid measures 14.5 x 4.9 x 6.0 cm.  Left lobe of the thyroid measures 13.4 x 7.0 x 10.9 cm.  This has increased in size when compared to the previous examination of June 11, 2013.  The isthmus measures 5.0 cm.  Heterogeneous thyroid parenchyma with increased vascularity.     No nodules.     Cervical lymph nodes demonstrate normal morphology and size.     Impression:     Diffuse markedly enlarged, heterogeneous thyroid gland with increased vascularity consistent with autoimmune thyroiditis which has increased in size since previous examination of 2013.      CT neck 4/2024 : Increase in size of large goiter without extension into the mediastinum. Displacement with moderate narrowing of the trachea.  Displacement of the right internal jugular vein and the common carotid artery       Seen ENT in 4/2024 - She is considering timing of surgery and will follow up with ENT.   Patient was recently scheduled for an endoscopy but was canceled pending management of her goiter.    Prior FNA biopsy: Denies    Thyroid function test: Normal.   Patient was on thyroid hormone replacement about 15-20 years back and discontinued as her thyroid levels were normal.      Head and neck radiation: Denies    Symptoms: Stable weight and appetite.     Compressive neck symptoms: Denies    Family history of thyroid cancer: Denies    Family history of thyroid disease:  Denies      Lab Results   Component Value Date    TSH 1.530 03/25/2024    TSH 1.452 02/29/2024    TSH 0.993 08/18/2014    TSH 1.009 05/07/2013    TSH 0.710 08/08/2011    FREET4 0.63 (L) 03/25/2024    FREET4 0.62 (L) 02/29/2024    FREET4 0.73 08/08/2011         ROS:   As above    Objective:     /74   Pulse 65   Wt 121.7 kg (268 lb 4.8 oz)   LMP 07/09/2014 Comment: till 8/12/2014  BMI 43.30 kg/m²     Body mass index is 43.3 kg/m².    Physical Exam  Constitutional:       General: She is not in acute distress.     Appearance: She is not ill-appearing.   HENT:      Head: Normocephalic.   Eyes:      Conjunctiva/sclera: Conjunctivae normal.   Neck:      Thyroid: Thyromegaly present.   Cardiovascular:      Rate and Rhythm: Normal rate.   Pulmonary:      Effort: Pulmonary effort is normal.   Neurological:      Mental Status: She is alert and oriented to person, place, and time.         Lab Review:   Lab Results   Component Value Date    HGBA1C 5.6 02/29/2024     Lab Results   Component Value Date    CHOL 217 (H) 02/29/2024    HDL 44 02/29/2024    LDLCALC 138.8 02/29/2024    TRIG 171 (H) 02/29/2024    CHOLHDL 20.3 02/29/2024     Lab Results   Component Value Date     02/29/2024    K 4.6 02/29/2024     02/29/2024    CO2 26 02/29/2024     02/29/2024    BUN 12 02/29/2024    CREATININE 0.8 02/29/2024    CALCIUM 10.0 02/29/2024    PROT 7.5 02/29/2024    ALBUMIN 4.0 02/29/2024    BILITOT 1.3 (H) 02/29/2024    ALKPHOS 50 (L) 02/29/2024    AST 16 02/29/2024    ALT 12 02/29/2024    ANIONGAP 10 02/29/2024    EGFRNORACEVR >60.0 02/29/2024    TSH 1.530 03/25/2024        Assessment and Plan     Problem List Items Addressed This Visit          Endocrine    Goiter - Primary       US thyroid in 02/2024 showed markedly enlarged thyroid, increased since 2013.  No nodules.  CT neck in 04/2024 showed increase in the size of the goiter without extension into the mediastinum.  Displacement and narrowing of the  trachea.    Denies compressive symptoms.  Clinically and biochemically euthyroid.    Patient will continue to follow-up with ENT regarding timing of her thyroid surgery.  Discuss lifelong treatment with levothyroxine after her thyroidectomy.           Class 3 obesity       Recommend good diet and lifestyle modification.                 Dorothy SIMMONS Rai, MD

## 2024-07-05 PROBLEM — E66.813 CLASS 3 OBESITY: Status: ACTIVE | Noted: 2024-07-05

## 2024-07-05 PROBLEM — E66.01 CLASS 3 OBESITY: Status: ACTIVE | Noted: 2024-07-05

## 2024-07-05 NOTE — ASSESSMENT & PLAN NOTE
US thyroid in 02/2024 showed markedly enlarged thyroid, increased since 2013.  No nodules.  CT neck in 04/2024 showed increase in the size of the goiter without extension into the mediastinum.  Displacement and narrowing of the trachea.    Denies compressive symptoms.  Clinically and biochemically euthyroid.    Patient will continue to follow-up with ENT regarding timing of her thyroid surgery.  Discuss lifelong treatment with levothyroxine after her thyroidectomy.

## 2025-04-30 DIAGNOSIS — Z12.31 OTHER SCREENING MAMMOGRAM: ICD-10-CM
